# Patient Record
Sex: MALE | Race: WHITE | NOT HISPANIC OR LATINO | Employment: OTHER | URBAN - METROPOLITAN AREA
[De-identification: names, ages, dates, MRNs, and addresses within clinical notes are randomized per-mention and may not be internally consistent; named-entity substitution may affect disease eponyms.]

---

## 2021-02-22 ENCOUNTER — APPOINTMENT (OUTPATIENT)
Dept: CARDIOLOGY | Facility: MEDICAL CENTER | Age: 76
End: 2021-02-22
Attending: GENERAL PRACTICE
Payer: COMMERCIAL

## 2021-02-22 ENCOUNTER — HOSPITAL ENCOUNTER (OUTPATIENT)
Facility: MEDICAL CENTER | Age: 76
End: 2021-02-24
Attending: EMERGENCY MEDICINE | Admitting: HOSPITALIST
Payer: COMMERCIAL

## 2021-02-22 DIAGNOSIS — R94.31 ABNORMAL EKG: ICD-10-CM

## 2021-02-22 DIAGNOSIS — I51.89 RIGHT ATRIAL MASS: ICD-10-CM

## 2021-02-22 DIAGNOSIS — R07.89 OTHER CHEST PAIN: ICD-10-CM

## 2021-02-22 DIAGNOSIS — R07.2 PRECORDIAL PAIN: ICD-10-CM

## 2021-02-22 PROBLEM — Z79.01 CHRONIC ANTICOAGULATION: Status: ACTIVE | Noted: 2021-02-22

## 2021-02-22 PROBLEM — I20.0 UNSTABLE ANGINA (HCC): Status: ACTIVE | Noted: 2021-02-22

## 2021-02-22 PROBLEM — I48.91 AF (ATRIAL FIBRILLATION) (HCC): Status: ACTIVE | Noted: 2021-02-22

## 2021-02-22 PROBLEM — I48.0 PAROXYSMAL ATRIAL FIBRILLATION (HCC): Status: ACTIVE | Noted: 2021-02-22

## 2021-02-22 PROBLEM — E78.5 HYPERLIPIDEMIA: Status: ACTIVE | Noted: 2021-02-22

## 2021-02-22 LAB
ALBUMIN SERPL BCP-MCNC: 4 G/DL (ref 3.2–4.9)
ALBUMIN/GLOB SERPL: 1.5 G/DL
ALP SERPL-CCNC: 86 U/L (ref 30–99)
ALT SERPL-CCNC: 10 U/L (ref 2–50)
ANION GAP SERPL CALC-SCNC: 11 MMOL/L (ref 7–16)
APTT PPP: >240 SEC (ref 24.7–36)
AST SERPL-CCNC: 10 U/L (ref 12–45)
BASOPHILS # BLD AUTO: 1.2 % (ref 0–1.8)
BASOPHILS # BLD: 0.06 K/UL (ref 0–0.12)
BILIRUB SERPL-MCNC: 0.8 MG/DL (ref 0.1–1.5)
BUN SERPL-MCNC: 15 MG/DL (ref 8–22)
CALCIUM SERPL-MCNC: 9 MG/DL (ref 8.5–10.5)
CHLORIDE SERPL-SCNC: 105 MMOL/L (ref 96–112)
CO2 SERPL-SCNC: 25 MMOL/L (ref 20–33)
CREAT SERPL-MCNC: 0.9 MG/DL (ref 0.5–1.4)
EKG IMPRESSION: NORMAL
EOSINOPHIL # BLD AUTO: 0.04 K/UL (ref 0–0.51)
EOSINOPHIL NFR BLD: 0.8 % (ref 0–6.9)
ERYTHROCYTE [DISTWIDTH] IN BLOOD BY AUTOMATED COUNT: 48.8 FL (ref 35.9–50)
GLOBULIN SER CALC-MCNC: 2.7 G/DL (ref 1.9–3.5)
GLUCOSE SERPL-MCNC: 95 MG/DL (ref 65–99)
HCT VFR BLD AUTO: 42.4 % (ref 42–52)
HGB BLD-MCNC: 13.9 G/DL (ref 14–18)
IMM GRANULOCYTES # BLD AUTO: 0.01 K/UL (ref 0–0.11)
IMM GRANULOCYTES NFR BLD AUTO: 0.2 % (ref 0–0.9)
INR PPP: 1.39 (ref 0.87–1.13)
LV EJECT FRACT  99904: 65
LV EJECT FRACT MOD 2C 99903: 64.56
LV EJECT FRACT MOD 4C 99902: 70.93
LV EJECT FRACT MOD BP 99901: 65.36
LYMPHOCYTES # BLD AUTO: 0.72 K/UL (ref 1–4.8)
LYMPHOCYTES NFR BLD: 14.6 % (ref 22–41)
MAGNESIUM SERPL-MCNC: 1.9 MG/DL (ref 1.5–2.5)
MCH RBC QN AUTO: 33.4 PG (ref 27–33)
MCHC RBC AUTO-ENTMCNC: 32.8 G/DL (ref 33.7–35.3)
MCV RBC AUTO: 101.9 FL (ref 81.4–97.8)
MONOCYTES # BLD AUTO: 0.28 K/UL (ref 0–0.85)
MONOCYTES NFR BLD AUTO: 5.7 % (ref 0–13.4)
NEUTROPHILS # BLD AUTO: 3.81 K/UL (ref 1.82–7.42)
NEUTROPHILS NFR BLD: 77.5 % (ref 44–72)
NRBC # BLD AUTO: 0 K/UL
NRBC BLD-RTO: 0 /100 WBC
PLATELET # BLD AUTO: 102 K/UL (ref 164–446)
PMV BLD AUTO: 11.2 FL (ref 9–12.9)
POTASSIUM SERPL-SCNC: 4 MMOL/L (ref 3.6–5.5)
PROT SERPL-MCNC: 6.7 G/DL (ref 6–8.2)
PROTHROMBIN TIME: 17.5 SEC (ref 12–14.6)
RBC # BLD AUTO: 4.16 M/UL (ref 4.7–6.1)
SODIUM SERPL-SCNC: 141 MMOL/L (ref 135–145)
TROPONIN T SERPL-MCNC: 6 NG/L (ref 6–19)
TROPONIN T SERPL-MCNC: <6 NG/L (ref 6–19)
WBC # BLD AUTO: 4.9 K/UL (ref 4.8–10.8)

## 2021-02-22 PROCEDURE — 93306 TTE W/DOPPLER COMPLETE: CPT | Mod: 26 | Performed by: INTERNAL MEDICINE

## 2021-02-22 PROCEDURE — U0005 INFEC AGEN DETEC AMPLI PROBE: HCPCS

## 2021-02-22 PROCEDURE — 84484 ASSAY OF TROPONIN QUANT: CPT | Mod: 91

## 2021-02-22 PROCEDURE — 93005 ELECTROCARDIOGRAM TRACING: CPT

## 2021-02-22 PROCEDURE — 85025 COMPLETE CBC W/AUTO DIFF WBC: CPT

## 2021-02-22 PROCEDURE — 83735 ASSAY OF MAGNESIUM: CPT

## 2021-02-22 PROCEDURE — A9270 NON-COVERED ITEM OR SERVICE: HCPCS | Performed by: GENERAL PRACTICE

## 2021-02-22 PROCEDURE — 99285 EMERGENCY DEPT VISIT HI MDM: CPT

## 2021-02-22 PROCEDURE — G0378 HOSPITAL OBSERVATION PER HR: HCPCS

## 2021-02-22 PROCEDURE — 99204 OFFICE O/P NEW MOD 45 MIN: CPT | Performed by: INTERNAL MEDICINE

## 2021-02-22 PROCEDURE — 85610 PROTHROMBIN TIME: CPT

## 2021-02-22 PROCEDURE — 80053 COMPREHEN METABOLIC PANEL: CPT

## 2021-02-22 PROCEDURE — U0003 INFECTIOUS AGENT DETECTION BY NUCLEIC ACID (DNA OR RNA); SEVERE ACUTE RESPIRATORY SYNDROME CORONAVIRUS 2 (SARS-COV-2) (CORONAVIRUS DISEASE [COVID-19]), AMPLIFIED PROBE TECHNIQUE, MAKING USE OF HIGH THROUGHPUT TECHNOLOGIES AS DESCRIBED BY CMS-2020-01-R: HCPCS

## 2021-02-22 PROCEDURE — 700102 HCHG RX REV CODE 250 W/ 637 OVERRIDE(OP): Performed by: GENERAL PRACTICE

## 2021-02-22 PROCEDURE — 93306 TTE W/DOPPLER COMPLETE: CPT

## 2021-02-22 PROCEDURE — 99220 PR INITIAL OBSERVATION CARE,LEVL III: CPT | Performed by: HOSPITALIST

## 2021-02-22 PROCEDURE — 93005 ELECTROCARDIOGRAM TRACING: CPT | Performed by: EMERGENCY MEDICINE

## 2021-02-22 PROCEDURE — 85730 THROMBOPLASTIN TIME PARTIAL: CPT

## 2021-02-22 RX ORDER — DILTIAZEM HYDROCHLORIDE 180 MG/1
180 CAPSULE, COATED, EXTENDED RELEASE ORAL DAILY
Status: DISCONTINUED | OUTPATIENT
Start: 2021-02-23 | End: 2021-02-24 | Stop reason: HOSPADM

## 2021-02-22 RX ORDER — DILTIAZEM HYDROCHLORIDE 180 MG/1
180 CAPSULE, EXTENDED RELEASE ORAL
COMMUNITY
Start: 2020-12-17

## 2021-02-22 RX ORDER — SIMETHICONE 80 MG
80 TABLET,CHEWABLE ORAL NIGHTLY
Status: DISCONTINUED | OUTPATIENT
Start: 2021-02-22 | End: 2021-02-24 | Stop reason: HOSPADM

## 2021-02-22 RX ORDER — ATORVASTATIN CALCIUM 40 MG/1
40 TABLET, FILM COATED ORAL
COMMUNITY

## 2021-02-22 RX ORDER — DILTIAZEM HYDROCHLORIDE 180 MG/1
180 CAPSULE, COATED, EXTENDED RELEASE ORAL DAILY
Status: DISCONTINUED | OUTPATIENT
Start: 2021-02-23 | End: 2021-02-22

## 2021-02-22 RX ORDER — ATORVASTATIN CALCIUM 40 MG/1
40 TABLET, FILM COATED ORAL
Status: DISCONTINUED | OUTPATIENT
Start: 2021-02-22 | End: 2021-02-24 | Stop reason: HOSPADM

## 2021-02-22 RX ORDER — ASPIRIN 81 MG/1
81 TABLET, CHEWABLE ORAL DAILY
Status: DISCONTINUED | OUTPATIENT
Start: 2021-02-23 | End: 2021-02-24 | Stop reason: HOSPADM

## 2021-02-22 RX ORDER — DILTIAZEM HYDROCHLORIDE 120 MG/1
120 TABLET, FILM COATED ORAL DAILY
COMMUNITY
End: 2021-02-22

## 2021-02-22 RX ORDER — NITROGLYCERIN 0.4 MG/1
0.4 TABLET SUBLINGUAL
Status: DISCONTINUED | OUTPATIENT
Start: 2021-02-22 | End: 2021-02-24 | Stop reason: HOSPADM

## 2021-02-22 RX ADMIN — ATORVASTATIN CALCIUM 40 MG: 40 TABLET, FILM COATED ORAL at 21:34

## 2021-02-22 RX ADMIN — SIMETHICONE 80 MG: 80 TABLET, CHEWABLE ORAL at 21:33

## 2021-02-22 ASSESSMENT — LIFESTYLE VARIABLES
HAVE YOU EVER FELT YOU SHOULD CUT DOWN ON YOUR DRINKING: NO
TOTAL SCORE: 0
TOTAL SCORE: 0
EVER HAD A DRINK FIRST THING IN THE MORNING TO STEADY YOUR NERVES TO GET RID OF A HANGOVER: NO
DOES PATIENT WANT TO STOP DRINKING: NO
EVER FELT BAD OR GUILTY ABOUT YOUR DRINKING: NO
ON A TYPICAL DAY WHEN YOU DRINK ALCOHOL HOW MANY DRINKS DO YOU HAVE: 0
ALCOHOL_USE: NO
TOTAL SCORE: 0
HAVE PEOPLE ANNOYED YOU BY CRITICIZING YOUR DRINKING: NO
CONSUMPTION TOTAL: NEGATIVE
HOW MANY TIMES IN THE PAST YEAR HAVE YOU HAD 5 OR MORE DRINKS IN A DAY: 0
AVERAGE NUMBER OF DAYS PER WEEK YOU HAVE A DRINK CONTAINING ALCOHOL: 0

## 2021-02-22 ASSESSMENT — ENCOUNTER SYMPTOMS
PND: 0
FLANK PAIN: 0
WEAKNESS: 0
ABDOMINAL PAIN: 0
NERVOUS/ANXIOUS: 0
ORTHOPNEA: 0
DIZZINESS: 0
PALPITATIONS: 1
HEARTBURN: 0
GASTROINTESTINAL NEGATIVE: 1
FEVER: 0
CONSTITUTIONAL NEGATIVE: 1
NAUSEA: 0
SHORTNESS OF BREATH: 0
EYES NEGATIVE: 1
MYALGIAS: 0
RESPIRATORY NEGATIVE: 1
PHOTOPHOBIA: 0
NEUROLOGICAL NEGATIVE: 1
SINUS PAIN: 0
BLURRED VISION: 0
SORE THROAT: 0
DEPRESSION: 0
VOMITING: 0
MUSCULOSKELETAL NEGATIVE: 1
CHILLS: 0

## 2021-02-22 ASSESSMENT — PATIENT HEALTH QUESTIONNAIRE - PHQ9
1. LITTLE INTEREST OR PLEASURE IN DOING THINGS: NOT AT ALL
SUM OF ALL RESPONSES TO PHQ9 QUESTIONS 1 AND 2: 0
2. FEELING DOWN, DEPRESSED, IRRITABLE, OR HOPELESS: NOT AT ALL

## 2021-02-22 ASSESSMENT — FIBROSIS 4 INDEX
FIB4 SCORE: 2.39

## 2021-02-22 NOTE — H&P
History & Physical Note    Date of Admission: 2/22/2021  Admission Status: Observation-Outpatient  Attending: RADHA Paniagua M.D.   Contact Number: [phone]    Chief Complaint: chest pain    History of Present Illness (HPI): 75 year old male with a history of Atrial fibrillation on Xarelto, Mitral valve prolapse, prostate cancer s/p prostatectomy, hyperlipidemia, presents for chest pain that started last night while sitting in bed trying to sleep. Pain reported in middle of chest, described as pressure. Occurred 4 times and lasted 5-15 minutes each time and resolved spontaneously. Sitting up alleviates the pain. Pain non-radiating. Never had this pain before. Associated with shortness of breath and palpitations, but denies headache, visual disturbances, nausea, vomiting, orthopnea, PND, abdominal pain, or any other symptoms. No history of MI, CVA, DVT/PE, or HTN. Patient reports he follows a Cardiologist in [place].      Currently denies chest pain, SOB, palpitations. Transferred from [facility] in [place]. ECG showed NSR with ST depression V3-V5 with normal troponin of 0.03. Given Aspirin 325mg Heparin 5000U bolus, and Nitro paste that has relief his chest pain.      Review of Systems: Review of Systems   Constitutional: Negative.  Negative for chills, fever and malaise/fatigue.   HENT: Negative for congestion, sinus pain and sore throat.    Eyes: Negative.  Negative for blurred vision and photophobia.   Respiratory: Negative.  Negative for shortness of breath.    Cardiovascular: Positive for chest pain and palpitations. Negative for orthopnea, leg swelling and PND.   Gastrointestinal: Negative.  Negative for abdominal pain, heartburn, nausea and vomiting.   Genitourinary: Negative.  Negative for dysuria and flank pain.   Musculoskeletal: Negative.  Negative for joint pain and myalgias.   Skin: Negative.  Negative for rash.   Neurological: Negative.  Negative for dizziness and  weakness.   Psychiatric/Behavioral: Negative for depression. The patient is not nervous/anxious.          Past Medical History:   Past Medical History was reviewed with patient.   has no past medical history on file.    Past Surgical History: Past Surgical History was reviewed with patient.   has no past surgical history on file.    Medications: Medications have been reviewed with patient.  Prior to Admission Medications   Prescriptions Last Dose Informant Patient Reported? Taking?   Multiple Vitamins-Minerals (ZINC PO) 2/21/2021 at Unknown time  Yes Yes   Sig: Take 1 capsule by mouth every day.   VITAMIN E PO 2/21/2021 at Unknown time  Yes Yes   Sig: Take 1 capsule by mouth every day.   atorvastatin (LIPITOR) 40 MG Tab 2/21/2021 at Unknown time  Yes Yes   Sig: Take 40 mg by mouth every bedtime.   diltiazem (TIAZAC) 180 MG SR capsule 2/21/2021 at Unknown time  Yes Yes   Sig: Take 180 mg by mouth every day.   rivaroxaban (XARELTO) 20 MG Tab tablet 2/21/2021 at Unknown time  Yes Yes   Sig: Take 20 mg by mouth every morning.      Facility-Administered Medications: None        Allergies: Allergies have been reviewed with patient.  No Known Allergies    Family History: unknown for patient  family history is not on file.     Social History:   Tobacco: never  Alcohol: not since college  Recreational drugs (illegal and prescription):  none   Employment: retired  Activity Level: very active  Living situation:  , lives in [place], [place]  Recent travel:  none  Primary Care Provider: reviewed Pcp Pt States None  Vitals:  Temp:  [36.2 °C (97.1 °F)] 36.2 °C (97.1 °F)  Pulse:  [] 79  Resp:  [13-25] 20  BP: (130-160)/(63-86) 136/63  SpO2:  [97 %-99 %] 97 %    Physical Exam  Vitals and nursing note reviewed.   Constitutional:       General: He is not in acute distress.     Appearance: Normal appearance. He is normal weight. He is not ill-appearing or toxic-appearing.   HENT:      Head: Normocephalic and atraumatic.       Mouth/Throat:      Mouth: Mucous membranes are dry.      Pharynx: No oropharyngeal exudate or posterior oropharyngeal erythema.   Eyes:      Extraocular Movements: Extraocular movements intact.      Conjunctiva/sclera: Conjunctivae normal.      Pupils: Pupils are equal, round, and reactive to light.   Cardiovascular:      Rate and Rhythm: Normal rate and regular rhythm.      Pulses: Normal pulses.      Heart sounds: Normal heart sounds. No murmur. No friction rub. No gallop.    Pulmonary:      Effort: Pulmonary effort is normal. No respiratory distress.      Breath sounds: Normal breath sounds. No stridor. No wheezing, rhonchi or rales.   Abdominal:      General: Bowel sounds are normal. There is no distension.      Palpations: Abdomen is soft.      Tenderness: There is no abdominal tenderness.   Musculoskeletal:         General: No tenderness or deformity.      Cervical back: Neck supple.      Right lower leg: No edema.      Left lower leg: No edema.   Skin:     General: Skin is dry.      Capillary Refill: Capillary refill takes less than 2 seconds.      Findings: No rash.   Neurological:      Mental Status: He is alert and oriented to person, place, and time.      Motor: No weakness.   Psychiatric:         Mood and Affect: Mood normal.         Behavior: Behavior normal.         Labs:   Recent Labs     02/22/21  1203   WBC 4.9   RBC 4.16*   HEMOGLOBIN 13.9*   HEMATOCRIT 42.4   .9*   MCH 33.4*   RDW 48.8   PLATELETCT 102*   MPV 11.2   NEUTSPOLYS 77.50*   LYMPHOCYTES 14.60*   MONOCYTES 5.70   EOSINOPHILS 0.80   BASOPHILS 1.20     Recent Labs     02/22/21  1203   SODIUM 141   POTASSIUM 4.0   CHLORIDE 105   CO2 25   GLUCOSE 95   BUN 15     Troponin 0.06      EKG: Per my read, NSR with no ST or T wave acute changes appreciated.    Imaging:   EC-ECHOCARDIOGRAM COMPLETE W/O CONT    (Results Pending)         Previous Data Review: reviewed    Problem Representation: 75 year old male with a history of Afib on Xarelto,  Mitral valve prolapse, prostate cancer s/p prostatectomy 2013, HLD, presents with new onset chest pain at rest for one day that is substernal and relieved with nitroglycerin. ECG shows ST depression 1mm anterior leads with normal troponin.    * Precordial pain- (present on admission)  Assessment & Plan  DDX: Unstable angina vs esophageal spasm, dyspepsia. atypical chest pain with 2/3 characteristics (substernal and relieved with nitroglycerin).ECG shows no ischemic changes on admission, though ST depression V3-V5 at [facility] concerning for anterior ischemia. Troponin 6 on admission. NANCY score 5 with 26% all cause mortality in the next two weeks, Heart score 3, with 12-16% risk of major adverse cardiac event in the next 6 weeks.  -admit to telemetry for observation  -troponin x 2  -ASA 81mg starting tomorrow  -restart home statin  -Nitro PRN  -hold Xarelto  -no Heparin indicated at this time per Cardiology  -continue Diltiazem  -start Metoprolol tartrate 12.5 mg BID  -Cardiology has been consulted, Dr. Sheriff: If troponin is negative and no recurrent chest pain, could first proceed with pharmacological myocardial perfusion imaging and ECHO tomorrow AM. If troponin become elevated continue have chest pain will proceed with cardiac catheterization tomorrow      Hyperlipidemia- (present on admission)  Assessment & Plan  -continue Atorvastatin  -repeat lipid     Paroxysmal atrial fibrillation (HCC)- (present on admission)  Assessment & Plan  CHADSVASC score 2 (age): annual risk of stroke 2.2%.  -telemetry  -hold home Xarelto  -continue Diltiazem  -Echo

## 2021-02-22 NOTE — ED NOTES
[facility] Medical reporting NEG Covid result. Result form will be faxed and placed in chart.

## 2021-02-22 NOTE — ED NOTES
Med rec complete per pt and verified strengths with [facility]. NKDA. Pt denies taking ABX in the last 2 weeks

## 2021-02-22 NOTE — ASSESSMENT & PLAN NOTE
"-Pending MPI.  Troponins negative.  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  He had a MURIEL back in 2017 which showed \"lipomatous changes are seen associated with the tricuspid annulus giving a prominent appearance. No pathological characteristics are observed\".  - continue ASA, statin pending stress test.  Continue beta-blocker and cardizem.  Xarelto on hold pending ischemic work-up.  -Cardiology trying to obtain images from his previous echo/MURIEL, and will decide if needs repeat MURIEL versus cardiac MRI.  - monitor on telemetry.   - continue PRN SL NTG for CP recurrence.   "

## 2021-02-22 NOTE — ASSESSMENT & PLAN NOTE
-Continue normal sinus rhythm.  -Continue Cardizem CD, along with Lopressor.  Xarelto on hold pending ischemic evaluation.  -Monitor on telemetry.

## 2021-02-22 NOTE — ED NOTES
Pippa from lab called with critical result PTT greater then 240, repeated result back to Pippa. Dr Moralez at pt bedside and results given to MD.

## 2021-02-22 NOTE — ED PROVIDER NOTES
ED Provider Note    CHIEF COMPLAINT  Chief Complaint   Patient presents with   • Chest Pain     BIB EMS of intermittent chest pain since last night. Described as pressure. Transferred from [facility] for further eval of ST Depression. Pt given 1 in of Nitro, reporting no chest pain currently.       HPI  Eugene Hay is a 75 y.o. male who presents to the emergency department complaining of chest pain and an abnormal EKG.  The patient was complaining of chest discomfort throughout the night last night.  Started in the middle of the night.  He had some pain and vague pressure sensation in the middle of his chest associated with tingling.  Not radiate down his arm.  He has had intermittent shortness of breath and this morning had a little bit of nausea.  No diaphoresis.  He had a slight bit of shortness of breath with exertion as well.  No history of PE or DVT.  No pain or swelling the legs.  No travel or immobilization.  No sudden onset of tearing pain radiation to the scapula.  She has a history of paroxysmal A. fib and he takes chronic anticoagulation.  Last stress test was 20 years ago.  No cardiac angiogram or MI.  He has a history of high blood pressure, and high cholesterol.  Denies diabetes or drug abuse.  No tobacco use.  [place] [facility] [place] showed some anterior ST depression.  He was given aspirin nitroglycerin and heparin and sent here for further evaluation.    REVIEW OF SYSTEMS  See HPI for further details. All other systems are negative.    PAST MEDICAL HISTORY  History reviewed. No pertinent past medical history.    FAMILY HISTORY  History reviewed. No pertinent family history.    SOCIAL HISTORY  Social History     Socioeconomic History   • Marital status:      Spouse name: Not on file   • Number of children: Not on file   • Years of education: Not on file   • Highest education level: Not on file   Occupational History   • Not on file   Tobacco Use   •  Smoking status: Never Smoker   • Smokeless tobacco: Never Used   Substance and Sexual Activity   • Alcohol use: Not Currently   • Drug use: Never   • Sexual activity: Not on file   Other Topics Concern   • Not on file   Social History Narrative   • Not on file     Social Determinants of Health     Financial Resource Strain:    • Difficulty of Paying Living Expenses:    Food Insecurity:    • Worried About Running Out of Food in the Last Year:    • Ran Out of Food in the Last Year:    Transportation Needs:    • Lack of Transportation (Medical):    • Lack of Transportation (Non-Medical):    Physical Activity:    • Days of Exercise per Week:    • Minutes of Exercise per Session:    Stress:    • Feeling of Stress :    Social Connections:    • Frequency of Communication with Friends and Family:    • Frequency of Social Gatherings with Friends and Family:    • Attends Denominational Services:    • Active Member of Clubs or Organizations:    • Attends Club or Organization Meetings:    • Marital Status:    Intimate Partner Violence:    • Fear of Current or Ex-Partner:    • Emotionally Abused:    • Physically Abused:    • Sexually Abused:        SURGICAL HISTORY  History reviewed. No pertinent surgical history.    CURRENT MEDICATIONS  Home Medications     Reviewed by Christian Mason R.N. (Registered Nurse) on 02/22/21 at 1156  Med List Status: Not Addressed   Medication Last Dose Status   ATORVASTATIN CALCIUM PO  Active   DILTIAZem (CARDIZEM) 120 MG Tab  Active   rivaroxaban (XARELTO) 20 MG Tab tablet  Active                ALLERGIES  No Known Allergies    PHYSICAL EXAM  VITAL SIGNS: /73   Pulse 94   Resp 20   SpO2 99%    Constitutional: Well developed, Well nourished, No acute distress, Non-toxic appearance.   HENT: Normocephalic, Atraumatic, Bilateral external ears normal, Oropharynx moist, No oral exudates, Nose normal.   Eyes: PERRL, EOMI, Conjunctiva normal, No discharge.   Neck: Normal range of  motion,  Cardiovascular: Normal heart rate, Normal rhythm, No murmurs, No rubs, No gallops.   Thorax & Lungs: Normal breath sounds, No respiratory distress, No wheezing  Abdomen: Bowel sounds normal, Soft, No tenderness  Skin: Warm, Dry, No erythema, No rash.   Back: No tenderness, No CVA tenderness.    Musculoskeletal: Good range of motion in all major joints.  No asymmetric edema  Neurologic: Alert,No focal deficits noted.   Psychiatric: Affect normal      Results for orders placed or performed during the hospital encounter of 02/22/21   CBC WITH DIFFERENTIAL   Result Value Ref Range    WBC 4.9 4.8 - 10.8 K/uL    RBC 4.16 (L) 4.70 - 6.10 M/uL    Hemoglobin 13.9 (L) 14.0 - 18.0 g/dL    Hematocrit 42.4 42.0 - 52.0 %    .9 (H) 81.4 - 97.8 fL    MCH 33.4 (H) 27.0 - 33.0 pg    MCHC 32.8 (L) 33.7 - 35.3 g/dL    RDW 48.8 35.9 - 50.0 fL    Platelet Count 102 (L) 164 - 446 K/uL    MPV 11.2 9.0 - 12.9 fL    Neutrophils-Polys 77.50 (H) 44.00 - 72.00 %    Lymphocytes 14.60 (L) 22.00 - 41.00 %    Monocytes 5.70 0.00 - 13.40 %    Eosinophils 0.80 0.00 - 6.90 %    Basophils 1.20 0.00 - 1.80 %    Immature Granulocytes 0.20 0.00 - 0.90 %    Nucleated RBC 0.00 /100 WBC    Neutrophils (Absolute) 3.81 1.82 - 7.42 K/uL    Lymphs (Absolute) 0.72 (L) 1.00 - 4.80 K/uL    Monos (Absolute) 0.28 0.00 - 0.85 K/uL    Eos (Absolute) 0.04 0.00 - 0.51 K/uL    Baso (Absolute) 0.06 0.00 - 0.12 K/uL    Immature Granulocytes (abs) 0.01 0.00 - 0.11 K/uL    NRBC (Absolute) 0.00 K/uL   COMP METABOLIC PANEL   Result Value Ref Range    Sodium 141 135 - 145 mmol/L    Potassium 4.0 3.6 - 5.5 mmol/L    Chloride 105 96 - 112 mmol/L    Co2 25 20 - 33 mmol/L    Anion Gap 11.0 7.0 - 16.0    Glucose 95 65 - 99 mg/dL    Bun 15 8 - 22 mg/dL    Creatinine 0.90 0.50 - 1.40 mg/dL    Calcium 9.0 8.5 - 10.5 mg/dL    AST(SGOT) 10 (L) 12 - 45 U/L    ALT(SGPT) 10 2 - 50 U/L    Alkaline Phosphatase 86 30 - 99 U/L    Total Bilirubin 0.8 0.1 - 1.5 mg/dL    Albumin 4.0  3.2 - 4.9 g/dL    Total Protein 6.7 6.0 - 8.2 g/dL    Globulin 2.7 1.9 - 3.5 g/dL    A-G Ratio 1.5 g/dL   TROPONIN   Result Value Ref Range    Troponin T <6 6 - 19 ng/L   APTT   Result Value Ref Range    APTT >240.0 (HH) 24.7 - 36.0 sec   PROTHROMBIN TIME (INR)   Result Value Ref Range    PT 17.5 (H) 12.0 - 14.6 sec    INR 1.39 (H) 0.87 - 1.13   ESTIMATED GFR   Result Value Ref Range    GFR If African American >60 >60 mL/min/1.73 m 2    GFR If Non African American >60 >60 mL/min/1.73 m 2   EKG   Result Value Ref Range    Report       [facility] Emergency Dept.    Test Date:  2021  Pt Name:    FILIBERTO HAHN             Department: ER  MRN:        [id number]                      Room:       [facility]  Gender:     Male                         Technician: [id number]  :        1945                   Requested By:ER TRIAGE PROTOCOL  Order #:    [id number]                    Reading MD: BEATA GIBSON. FRITZ    Measurements  Intervals                                Axis  Rate:       90                           P:          74  ID:         [id number]                          QRS:        0  QRSD:       73                           T:          62  QT:         360  QTc:        440    Interpretive Statements  Sinus rhythm  Borderline low voltage, extremity leads  No previous ECG available for comparison  Electronically Signed On 2021 11:56:26 PST by BEATA GIBSON. [facility]          RADIOLOGY/PROCEDURES  No orders to display         COURSE & MEDICAL DECISION MAKING  Pertinent Labs & Imaging studies reviewed. (See chart for details)  The patient presents here transferred from an outlying hospital for chest pain and abnormal EKG.  Differential diagnosis includes but not limited to COVID-19, pneumonia, ACS, dissection, PE, unstable angina.    Patient is worked up with above differential diagnosis at the transferring hospital had a negative chest x-ray negative D-dimer and negative troponin.    Is negative chest  x-ray makes pneumonia pneumothorax unlikely.  His clinical history is not suggestive of of a dissection or a PE.  He had a negative D-dimer at the transferring hospital.      The patient was transferred here on heparin this is stopped.    The patient's EKG here shows some subtle ST depression but I do not think this is ischemic in the absence of discomfort.  We will wait for his troponin.  Discussed the case with the hospitalist team the patient will hospitalist for further work-up and treatment care transferred at that time.      The patient did have an elevated PTT the heparin has been held his entire time here in the ED.  Troponin remains negative.    FINAL IMPRESSION  1. Other chest pain     2. Abnormal EKG         3.         Electronically signed by: Preet Howard M.D., 2/22/2021 12:34 PM

## 2021-02-22 NOTE — ED TRIAGE NOTES
Chief Complaint   Patient presents with   • Chest Pain     BIB EMS of intermittent chest pain since last night. Described as pressure. Transferred from [facility] for further eval of ST Depression. Pt given 1 in of Nitro, reporting no chest pain currently.

## 2021-02-22 NOTE — DISCHARGE PLANNING
Pt lives with wife in [place] although they are in the process of moving here. He has PCP in [place] (Dr Hamlin or Dr Pineda). Has [facility] insurance with Rx benefits.      Care Transition Team Assessment  (Denis Srinivasan, sponsor~ [phone])    Information Source  Orientation : Oriented x 4  Information Given By: Patient  Who is responsible for making decisions for patient? : Patient    Readmission Evaluation  Is this a readmission?: No    Elopement Risk  Legal Hold: No    Interdisciplinary Discharge Planning  Does Admitting Nurse Feel This Could be a Complex Discharge?: No  Primary Care Physician: Dr Hamlin in [place]  Lives with - Patient's Self Care Capacity: Significant Other  Support Systems: Spouse / Significant Other, Friends / Neighbors  Do You Take your Prescribed Medications Regularly: Yes  Able to Return to Previous ADL's: Yes  Mobility Issues: No  Patient Prefers to be Discharged to:: home  Assistance Needed: Unknown at this Time  Durable Medical Equipment: Not Applicable    Discharge Preparedness  What is your plan after discharge?: Home with help  Prior Functional Level: Independent with Activities of Daily Living  Difficulity with ADLs: None  Difficulity with IADLs: None    Functional Assesment  Prior Functional Level: Independent with Activities of Daily Living    Finances  Financial Barriers to Discharge: No  Prescription Coverage: Yes    Advance Directive  Advance Directive?: None    Domestic Abuse  Have you ever been the victim of abuse or violence?: No     Anticipated Discharge Information  Discharge Disposition: Still a Patient (30)  Discharge Address: [address] [place] [address]  Discharge Contact Phone Number: [phone]

## 2021-02-23 ENCOUNTER — APPOINTMENT (OUTPATIENT)
Dept: RADIOLOGY | Facility: MEDICAL CENTER | Age: 76
End: 2021-02-23
Attending: INTERNAL MEDICINE
Payer: COMMERCIAL

## 2021-02-23 ENCOUNTER — APPOINTMENT (OUTPATIENT)
Dept: RADIOLOGY | Facility: MEDICAL CENTER | Age: 76
End: 2021-02-23
Attending: GENERAL PRACTICE
Payer: COMMERCIAL

## 2021-02-23 PROBLEM — I51.89 RIGHT ATRIAL MASS: Status: ACTIVE | Noted: 2021-02-23

## 2021-02-23 LAB
ALBUMIN SERPL BCP-MCNC: 3.8 G/DL (ref 3.2–4.9)
ALBUMIN/GLOB SERPL: 1.5 G/DL
ALP SERPL-CCNC: 78 U/L (ref 30–99)
ALT SERPL-CCNC: 8 U/L (ref 2–50)
ANION GAP SERPL CALC-SCNC: 11 MMOL/L (ref 7–16)
AST SERPL-CCNC: 11 U/L (ref 12–45)
BILIRUB SERPL-MCNC: 0.9 MG/DL (ref 0.1–1.5)
BUN SERPL-MCNC: 18 MG/DL (ref 8–22)
CALCIUM SERPL-MCNC: 8.9 MG/DL (ref 8.5–10.5)
CHLORIDE SERPL-SCNC: 106 MMOL/L (ref 96–112)
CHOLEST SERPL-MCNC: 101 MG/DL (ref 100–199)
CO2 SERPL-SCNC: 23 MMOL/L (ref 20–33)
CREAT SERPL-MCNC: 0.81 MG/DL (ref 0.5–1.4)
ERYTHROCYTE [DISTWIDTH] IN BLOOD BY AUTOMATED COUNT: 48.4 FL (ref 35.9–50)
GLOBULIN SER CALC-MCNC: 2.5 G/DL (ref 1.9–3.5)
GLUCOSE SERPL-MCNC: 85 MG/DL (ref 65–99)
HCT VFR BLD AUTO: 40.4 % (ref 42–52)
HDLC SERPL-MCNC: 41 MG/DL
HGB BLD-MCNC: 13.2 G/DL (ref 14–18)
LDLC SERPL CALC-MCNC: 48 MG/DL
MCH RBC QN AUTO: 33.1 PG (ref 27–33)
MCHC RBC AUTO-ENTMCNC: 32.7 G/DL (ref 33.7–35.3)
MCV RBC AUTO: 101.3 FL (ref 81.4–97.8)
PLATELET # BLD AUTO: 112 K/UL (ref 164–446)
PMV BLD AUTO: 11.2 FL (ref 9–12.9)
POTASSIUM SERPL-SCNC: 3.8 MMOL/L (ref 3.6–5.5)
PROT SERPL-MCNC: 6.3 G/DL (ref 6–8.2)
RBC # BLD AUTO: 3.99 M/UL (ref 4.7–6.1)
SARS-COV-2 RNA RESP QL NAA+PROBE: NOTDETECTED
SODIUM SERPL-SCNC: 140 MMOL/L (ref 135–145)
SPECIMEN SOURCE: NORMAL
TRIGL SERPL-MCNC: 62 MG/DL (ref 0–149)
TROPONIN T SERPL-MCNC: 7 NG/L (ref 6–19)
WBC # BLD AUTO: 4.8 K/UL (ref 4.8–10.8)

## 2021-02-23 PROCEDURE — 99225 PR SUBSEQUENT OBSERVATION CARE,LEVEL II: CPT | Performed by: INTERNAL MEDICINE

## 2021-02-23 PROCEDURE — 80053 COMPREHEN METABOLIC PANEL: CPT

## 2021-02-23 PROCEDURE — A9502 TC99M TETROFOSMIN: HCPCS

## 2021-02-23 PROCEDURE — 71045 X-RAY EXAM CHEST 1 VIEW: CPT

## 2021-02-23 PROCEDURE — A9270 NON-COVERED ITEM OR SERVICE: HCPCS | Performed by: GENERAL PRACTICE

## 2021-02-23 PROCEDURE — G0378 HOSPITAL OBSERVATION PER HR: HCPCS

## 2021-02-23 PROCEDURE — 85027 COMPLETE CBC AUTOMATED: CPT

## 2021-02-23 PROCEDURE — 99214 OFFICE O/P EST MOD 30 MIN: CPT | Performed by: INTERNAL MEDICINE

## 2021-02-23 PROCEDURE — 700111 HCHG RX REV CODE 636 W/ 250 OVERRIDE (IP)

## 2021-02-23 PROCEDURE — 84484 ASSAY OF TROPONIN QUANT: CPT

## 2021-02-23 PROCEDURE — 700102 HCHG RX REV CODE 250 W/ 637 OVERRIDE(OP): Performed by: GENERAL PRACTICE

## 2021-02-23 PROCEDURE — 80061 LIPID PANEL: CPT

## 2021-02-23 RX ORDER — REGADENOSON 0.08 MG/ML
INJECTION, SOLUTION INTRAVENOUS
Status: COMPLETED
Start: 2021-02-23 | End: 2021-02-23

## 2021-02-23 RX ORDER — REGADENOSON 0.08 MG/ML
0.4 INJECTION, SOLUTION INTRAVENOUS ONCE
Status: COMPLETED | OUTPATIENT
Start: 2021-02-23 | End: 2021-02-23

## 2021-02-23 RX ADMIN — REGADENOSON 0.4 MG: 0.08 INJECTION, SOLUTION INTRAVENOUS at 15:02

## 2021-02-23 RX ADMIN — ATORVASTATIN CALCIUM 40 MG: 40 TABLET, FILM COATED ORAL at 20:38

## 2021-02-23 RX ADMIN — SIMETHICONE 80 MG: 80 TABLET, CHEWABLE ORAL at 20:38

## 2021-02-23 RX ADMIN — METOPROLOL TARTRATE 12.5 MG: 25 TABLET, FILM COATED ORAL at 18:53

## 2021-02-23 RX ADMIN — DILTIAZEM HYDROCHLORIDE 180 MG: 180 CAPSULE, COATED, EXTENDED RELEASE ORAL at 04:29

## 2021-02-23 ASSESSMENT — PATIENT HEALTH QUESTIONNAIRE - PHQ9
2. FEELING DOWN, DEPRESSED, IRRITABLE, OR HOPELESS: NOT AT ALL
SUM OF ALL RESPONSES TO PHQ9 QUESTIONS 1 AND 2: 0
1. LITTLE INTEREST OR PLEASURE IN DOING THINGS: NOT AT ALL
1. LITTLE INTEREST OR PLEASURE IN DOING THINGS: NOT AT ALL
2. FEELING DOWN, DEPRESSED, IRRITABLE, OR HOPELESS: NOT AT ALL
SUM OF ALL RESPONSES TO PHQ9 QUESTIONS 1 AND 2: 0

## 2021-02-23 ASSESSMENT — PAIN SCALES - WONG BAKER: WONGBAKER_NUMERICALRESPONSE: DOESN'T HURT AT ALL

## 2021-02-23 ASSESSMENT — PAIN DESCRIPTION - PAIN TYPE: TYPE: ACUTE PAIN

## 2021-02-23 NOTE — PROGRESS NOTES
Report received from PHOEBE Georges.  Patient brought up to the CDU form the Red Pod of the ED via [place] by an ACLS RN in the ED.  POC gone over with the patient, and all questions answered.  Patient A & O  X 4.  No apparent signs of distress.  Safety precautions in place.  Patient educated to call for assistance.  Will continue to monitor.

## 2021-02-23 NOTE — PROGRESS NOTES
"       [facility] Cardiology Follow-up Note    Date of Service:    2/23/2021      Name:   Eugene Hay     YOB: 1945  Age:   75 y.o.  male   MRN:   [id number]      Chief Complaint: AFIB, CP, R atrial mass    HPI:  Mr Hay is a 76 y/o fellow with PMH including PAF on Xarelto who presented from [facility] with chest pain. Ruled out for ACS with negative trop x 3, however echo found a right atrial mass.    Review of Care Everywhere shows MURIEL from 2017 at [facility] in [place], [place] with R atrial abnormality.  Patient states he was aware of this, but they said it was \"fatty area\" nothing to worry about.      I can also see he had prior TTE in 11/2019 at outside facility.    Interim Events:  He c/o of chest discomfort overnight.   Momentary \"blips\" to the lower sternum followed by a few minutes of chest pressure.  This occurred several times throughout the night.   States burping relieved his pain.  This pain is different than what brought him in.     ROS  Constitutional:  + fatigue. .  Respiratory:  Denies shortness of breath, no cough.  Cardiovascular:  + atypical chest pain.  No  lower extremity edema.  Denies orthopnea or PND.  : denies polyuria, no dysuria.  GI:  Denies nausea/vomiting.  No abdominal distention.  Neuro:  Denies dizziness, syncope.  Hem/lymph: Denies easy bleeding/bruising.      All other review of systems reviewed and negative.    Past medical, surgical, social, and family history reviewed and unchanged from admission except as noted in HPI.    Medications: Reviewed in MAR  Current Facility-Administered Medications   Medication Dose Frequency Provider Last Rate Last Admin   • atorvastatin (LIPITOR) tablet 40 mg  40 mg QHS Enio Moralez Jr., M.D.   40 mg at 02/22/21 2134   • aspirin (ASA) chewable tab 81 mg  81 mg DAILY Enio Moralez Jr., M.D.       • nitroglycerin (NITROSTAT) tablet 0.4 mg  0.4 mg Q5 MIN PRN Enio Moralez Jr., M.D.       • simethicone (MYLICON) " "chewable tab 80 mg  80 mg Nightly Enio Moralez Jr., M.D.   80 mg at 02/22/21 2133   • DILTIAZem CD (CARDIZEM CD) capsule 180 mg  180 mg DAILY Enio Moralez Jr., M.D.   180 mg at 02/23/21 0429   • metoprolol tartrate (LOPRESSOR) tablet 12.5 mg  12.5 mg TWICE DAILY Enio Moralez Jr., M.D.       Last reviewed on 2/22/2021 12:50 PM by Sally Fong T    No Known Allergies    Physical Exam  Body mass index is 21.12 kg/m². /58   Pulse 97   Temp 36.7 °C (98 °F) (Temporal)   Resp (!) 29   Ht 1.727 m (5' 8\")   Wt 63 kg (138 lb 14.2 oz)   SpO2 96%    Vitals:    02/22/21 2000 02/22/21 2032 02/23/21 0000 02/23/21 0500   BP:   131/61 116/58   Pulse: 99  91 97   Resp: (!) 27  15 (!) 29   Temp: 36.7 °C (98 °F)  36.7 °C (98 °F)    TempSrc: Temporal  Temporal    SpO2: 95%  98% 96%   Weight:  63 kg (138 lb 14.2 oz)     Height:        Oxygen Therapy:  Pulse Oximetry: 96 %, O2 (LPM): 0, O2 Delivery Device: None - Room Air    General: no apparent distress  Neck: no JVD   Lungs: normal effort,  without crackles, no wheezing or rhonchi  Heart: normal rate, regular rhythm, no murmur, no rub  EXT: no lower extremity edema  Abdomen: soft, non tender, non distended  Neurological: No focal deficits, no facial asymmetry.  Normal speech  Psychiatric: Appropriate affect, alert and oriented x 3  Skin: Warm extremities, no rash    Labs (personally reviewed):     Lab Results   Component Value Date/Time    SODIUM 140 02/23/2021 04:30 AM    POTASSIUM 3.8 02/23/2021 04:30 AM    CHLORIDE 106 02/23/2021 04:30 AM    CO2 23 02/23/2021 04:30 AM    GLUCOSE 85 02/23/2021 04:30 AM    BUN 18 02/23/2021 04:30 AM    CREATININE 0.81 02/23/2021 04:30 AM    CREATININE 1.0 04/10/2007 04:00 PM    BUNCREATRAT NOT APPLICABLE 09/11/2018 01:04 PM     Lab Results   Component Value Date/Time    ALKPHOSPHAT 78 02/23/2021 04:30 AM    ASTSGOT 11 (L) 02/23/2021 04:30 AM    ALTSGPT 8 02/23/2021 04:30 AM    TBILIRUBIN 0.9 02/23/2021 04:30 AM    "   Lab Results   Component Value Date/Time    CHOLSTRLTOT 101 2021 04:30 AM    LDL 48 2021 04:30 AM    HDL 41 2021 04:30 AM    TRIGLYCERIDE 62 2021 04:30 AM         Cardiac Imaging and Procedures Review:      Personal Telemetry Review:  SR in the 70s on tele. No arrhythmia, no ectopy.    Echo 21:  CONCLUSIONS  Normal chamber sizes. Normal RV and LV systolic function. LVEF 65%   visually. No significant valvular abnormalities. There is an echogenic   multilobular mass that appears to be attached to the free wall in the   RA, best seen in substernal and apical views. Consider CT or MRI to get   a better look at this mass. No prior study is available for comparison.    Echo 2019 ([facility])    INTERPRETATION SUMMARY  1.) The left ventricle is normal in size and function, ejection fraction is 70% calculated by biplane  2.) The right ventricle is normal in size and function  3.) No significant valvular abnormality seen  4.) Unable to assess for pulmonary hypertension    MURIEL 2017:    INTERPRETATION SUMMARY  1. Grossly normal LV size and systolic function.  2. Mild MR.  3. Trace AR, TR, and PI.  4. Prominent lopez terminalis is seen within the right atrium both on  surface echo images as well as MURIEL.  5. Lipomatous changes are seen associated with the tricuspid annulus giving a  prominent appearance. No pathological characteristics are observed. However,  if clinically indicated, recommend gated CT for further imaging.    Stress test 2019:  [facility]  I cannot see results in care everywhere  No comments in follow up cardiology visit 19 (Dr. Minor)      Assessment and Medical Decision Makin   Chest pain, atypical   -    Ruled out for ACS with neg trop x 3  -    Will proceed with nuclear stress test today.    2   Paroxysmal atrial fibrillation  -   Continue Dilt 180, metoprolol 12.5 BID  -   Xarelto on hold pending ischemic eval.    3   Hx of AVNRT    4   Right atrial  mass  -    Discussed with Dr. Sheriff this AM, will cxl MRI for now.  Obtain images from prior MURIEL, TTE at outside hospital.     Will follow.       Candi Wolfe PA-C  [facility]

## 2021-02-23 NOTE — PROGRESS NOTES
Update :   Echo done today 02/22/21 Showed an echogenic multilobular mass attached to the free wall in the right atrium.    Will order a cardiac MRI to get a better look at this mass as per the recommendations by .

## 2021-02-23 NOTE — DISCHARGE PLANNING
Updated Amaya BECERRA CM that patient might need a ride assist. Spoke with Chantal and cost for [facility] to [facility] is $75.00.

## 2021-02-23 NOTE — PROGRESS NOTES
Hospital Medicine Daily Progress Note    Date of Service  2/23/2021    Chief Complaint  Chest pain    Hospital Course  75 y.o. male with atrial fibrillation on Xarelto, history of mitral valve prolapse, history of prostate cancer, hyperlipidemia, admitted 2/22/2021 with atypical chest pain.  Troponin was negative.  EKG showed nonspecific 1 mm ST depressions in anterior leads.  Cardiology was consulted, who recommended stress testing.    Interval Problem Update  2/23/2021 - I reviewed the patient's chart. There were no significant overnight events. Remains hemodynamically stable and afebrile. Stable on RA.  No leukocytosis.  Hemoglobin is stable.  Electrolytes and renal function, LFTs were normal.  Her troponins remain negative x3.  Echocardiogram showed EF of 65%, with no significant valvular abnormalities, with echogenic multilobular mass that appears to be attached to the free wall in the right atrium best seen in the substernal and apical views. MPI is pending. Discussed with cardiology, they will get previous echo and MURIEL records from [place] and decide if we will need MURIEL versus cardiac MRI.    > I have personally seen and examined the patient today. He is currently comfortable.  Currently chest pain-free.  No other complaints.  He is eating well.  No nausea, vomiting, shortness of breath, abdominal pain, bowel movement changes.      Consultants/Specialty  cardiology    Code Status  Full Code    Disposition  Monitor in the CDU    Review of Systems  ROS     Pertinent positives/negatives as mentioned above.     A complete review of systems was personally done by me. All other systems were negative.       Physical Exam  Temp:  [36.4 °C (97.5 °F)-36.7 °C (98 °F)] 36.4 °C (97.5 °F)  Pulse:  [] 100  Resp:  [15-29] 20  BP: (104-146)/(53-75) 146/75  SpO2:  [95 %-98 %] 98 %    Physical Exam  Vitals reviewed.   Constitutional:       General: He is not in acute distress.     Appearance: Normal appearance. He is not  toxic-appearing or diaphoretic.   HENT:      Head: Normocephalic and atraumatic.      Right Ear: External ear normal.      Left Ear: External ear normal.      Mouth/Throat:      Mouth: Mucous membranes are moist.      Pharynx: No oropharyngeal exudate.   Eyes:      General: No scleral icterus.     Extraocular Movements: Extraocular movements intact.      Conjunctiva/sclera: Conjunctivae normal.      Pupils: Pupils are equal, round, and reactive to light.   Cardiovascular:      Rate and Rhythm: Normal rate and regular rhythm.      Heart sounds: Normal heart sounds. No murmur. No gallop.    Pulmonary:      Effort: Pulmonary effort is normal. No respiratory distress.      Breath sounds: Normal breath sounds. No stridor. No wheezing, rhonchi or rales.   Chest:      Chest wall: No tenderness.   Abdominal:      General: Bowel sounds are normal. There is no distension.      Palpations: Abdomen is soft. There is no mass.      Tenderness: There is no abdominal tenderness. There is no guarding or rebound.   Musculoskeletal:         General: No swelling. Normal range of motion.      Cervical back: Normal range of motion and neck supple.      Right lower leg: No edema.      Left lower leg: No edema.   Lymphadenopathy:      Cervical: No cervical adenopathy.   Skin:     General: Skin is warm and dry.      Coloration: Skin is not jaundiced.      Findings: No rash.   Neurological:      General: No focal deficit present.      Mental Status: He is alert and oriented to person, place, and time.      Cranial Nerves: No cranial nerve deficit.   Psychiatric:         Mood and Affect: Mood normal.         Behavior: Behavior normal.         Thought Content: Thought content normal.         Judgment: Judgment normal.         Fluids    Intake/Output Summary (Last 24 hours) at 2/23/2021 1608  Last data filed at 2/22/2021 1900  Gross per 24 hour   Intake 240 ml   Output --   Net 240 ml       Laboratory  Recent Labs     02/22/21  1203  "02/23/21  0430   WBC 4.9 4.8   RBC 4.16* 3.99*   HEMOGLOBIN 13.9* 13.2*   HEMATOCRIT 42.4 40.4*   .9* 101.3*   MCH 33.4* 33.1*   MCHC 32.8* 32.7*   RDW 48.8 48.4   PLATELETCT 102* 112*   MPV 11.2 11.2     Recent Labs     02/22/21  1203 02/23/21  0430   SODIUM 141 140   POTASSIUM 4.0 3.8   CHLORIDE 105 106   CO2 25 23   GLUCOSE 95 85   BUN 15 18   CREATININE 0.90 0.81   CALCIUM 9.0 8.9     Recent Labs     02/22/21  1203   APTT >240.0*   INR 1.39*         Recent Labs     02/23/21  0430   TRIGLYCERIDE 62   HDL 41   LDL 48       Imaging  NM-CARDIAC STRESS TEST         EC-ECHOCARDIOGRAM COMPLETE W/O CONT   Final Result      DX-CHEST-PORTABLE (1 VIEW)    (Results Pending)        Assessment/Plan  * Precordial pain- (present on admission)  Assessment & Plan  -Pending MPI.  Troponins negative.  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  He had a MURIEL back in 2017 which showed \"lipomatous changes are seen associated with the tricuspid annulus giving a prominent appearance. No pathological characteristics are observed\".  - continue ASA, statin pending stress test.  Continue beta-blocker and cardizem.  Xarelto on hold pending ischemic work-up.  -Cardiology trying to obtain images from his previous echo/MURIEL, and will decide if needs repeat MURIEL versus cardiac MRI.  - monitor on telemetry.   - continue PRN SL NTG for CP recurrence.     Right atrial mass  Assessment & Plan  - workup as above.     Chronic anticoagulation- (present on admission)  Assessment & Plan  -Xarelto on hold pending ischemic evaluation.    Hyperlipidemia- (present on admission)  Assessment & Plan  -continue Atorvastatin.    Paroxysmal atrial fibrillation (HCC)- (present on admission)  Assessment & Plan  -Continue normal sinus rhythm.  -Continue Cardizem CD, along with Lopressor.  Xarelto on hold pending ischemic evaluation.  -Monitor on telemetry.       VTE prophylaxis: SCD      "

## 2021-02-23 NOTE — CONSULTS
"Cardiology Consultation Note      Date of service: 2/22/2021      Requesting Physician: Dr. Tyler Paniagua      Reason for consultation: Chest pain with abnormal EKG      History of present illness    The patient is 75 years old male with history of paroxysmal atrial fibrillation on anticoagulation and dyslipidemia who was transferred from [facility] for evaluation of chest pain.  He stated that he has been having intermittent \"blip\" in his chest when he lies down for years. Last night, he however was experiencing different type of chest discomfort described as squeezing pressure-like sensation off and on lasting up to 5-10 minutes.  There was no associated nausea, vomiting, shortness of breath or diaphoresis.  He denies recent unusual strenuous activity, flulike illness or recent long trip.    Initially EKG at another facility by my review showed relatively horizontal ST depression in the anterolateral leads slightly less than 1 mm and probbale left atrial management.    His Tn has been negative.  He is currently pain free      No Known Allergies    @HOMEMEDS    Xarelto 20 mg daily, diltiazem 180 mg SR daily, atorvastatin 40 mg daily      Current Facility-Administered Medications:   •  atorvastatin (LIPITOR) tablet 40 mg, 40 mg, Oral, QHS, Enio Moralez Jr., M.D.  •  [START ON 2/23/2021] DILTIAZem CD (CARDIZEM CD) capsule 180 mg, 180 mg, Oral, DAILY, Enio Moralez Jr., M.D.  •  [START ON 2/23/2021] aspirin (ASA) chewable tab 81 mg, 81 mg, Oral, DAILY, Enio Moralez Jr., M.D.  •  nitroglycerin (NITROSTAT) tablet 0.4 mg, 0.4 mg, Sublingual, Q5 MIN PRN, Enio Moralez Jr., M.D.    Past medical history. No DM or HTN     History reviewed. No pertinent surgical history.    History reviewed. No pertinent family history.    Social History     Socioeconomic History   • Marital status:      Spouse name: Not on file   • Number of children: Not on file   • Years of education: " Not on file   • Highest education level: Not on file   Occupational History   • Not on file   Tobacco Use   • Smoking status: Never Smoker   • Smokeless tobacco: Never Used   Substance and Sexual Activity   • Alcohol use: Not Currently   • Drug use: Never   • Sexual activity: Not on file   Other Topics Concern   • Not on file   Social History Narrative   • Not on file     Social Determinants of Health     Financial Resource Strain:    • Difficulty of Paying Living Expenses:    Food Insecurity:    • Worried About Running Out of Food in the Last Year:    • Ran Out of Food in the Last Year:    Transportation Needs:    • Lack of Transportation (Medical):    • Lack of Transportation (Non-Medical):    Physical Activity:    • Days of Exercise per Week:    • Minutes of Exercise per Session:    Stress:    • Feeling of Stress :    Social Connections:    • Frequency of Communication with Friends and Family:    • Frequency of Social Gatherings with Friends and Family:    • Attends [employer] Services:    • Active Member of Clubs or Organizations:    • Attends Club or Organization Meetings:    • Marital Status:    Intimate Partner Violence:    • Fear of Current or Ex-Partner:    • Emotionally Abused:    • Physically Abused:    • Sexually Abused:          Review of systems;    General: No fever, chills, no recent weight change, no weakness or fatigue  HENT: No discharge, no ringing in the ears, no toothache or sore throat, no neck pain  Eyes: No redness, no blurred vision or double vision  Heart: No palpitation, no PND or orthopnea, no claudication, no leg swelling  Lung: No productive cough, no hemoptysis  Abdomen: No abdominal pain, no nausea vomiting or diarrhea, no blood in stool  : No dysuria, no frequency or hematuria  Musculoskeletal: No myalgia, no back pain, some joint pain  Hematology: No easy bruising  Skin: No rash or itching  Neurological: No headache, no new focal weakness or numbness  Psychological: Denies  "depression, anxiety or insomnia  All other review of systems are negative    Vitals:    02/22/21 1301 02/22/21 1320 02/22/21 1340 02/22/21 1603   BP:  134/70 135/69 136/63   Pulse: 95 (!) 101 92 79   Resp: 19 (!) 25 13 20   Temp:    36.2 °C (97.1 °F)   TempSrc:    Temporal   SpO2: 97% 98% 99% 97%   Weight:    63 kg (138 lb 14.2 oz)   Height:    1.727 m (5' 8\")     GENERAL not in acute distress, not dyspnic at rest, thin  Head atraumatic, normocephalic  Eyes EOMI  ENT neck supple, no JVD, no carotid bruits or thyromegaly  Lung good expansion, distant sound, no rales or wheezing  Heart RRR, normal rate, no murmur, gallop or rub  Abd soft, no tenderness, mass or bruits  Ext no edema  Skin no ecchymosis or petechiae  Musculoskeletal no deformity  Neuro grossly intact  Psych anxious    Lab CMP normal.  Troponin T less than 6    Assessment and plans    1.  Precordial pain  Tn is negative. EKG is somewhat concerning given his age and risk factor.  Would continue serial troponin.  If troponin is negative and no recurrent chest pain, could first proceed with pharmacological myocardial perfusion imaging and ECHO.  Certainly if troponin become elevated continue have chest pain will proceed with cardiac catheterization.  We will hold off on Xarelto.  Continue statin.  Start low-dose beta-blocker.    2.  Paroxysmal atrial fibrillation currently in sinus rhythm  Hold Xarelto as above    3.  Chronic anticoagulation for atrial fibrillation  As above    4.  Hyperlipidemia  Continue statin    5.  Palpitations prob PVCs  In-patient cardiac monitor  ECHO    Will follow the patient along with you.  Thank you consultation.    Please note that this dictation was created using voice recognition software. I have worked with consultants from the vendor as well as technical experts from [employer] to optimize the interface. I have made every reasonable attempt to correct obvious errors, but I expect that there are errors of grammar and " possibly content I did not discover before finalizing the note

## 2021-02-23 NOTE — CARE PLAN
Problem: Safety  Goal: Will remain free from injury  Outcome: PROGRESSING AS EXPECTED  Goal: Will remain free from falls  Outcome: PROGRESSING AS EXPECTED  Note: Pt educated on safety precautions, utilization of the call light, and bed alarm.  Pt verbalized understanding.      Problem: Pain Management  Goal: Pain level will decrease to patient's comfort goal  Outcome: PROGRESSING AS EXPECTED     Problem: Infection  Goal: Will remain free from infection  Outcome: PROGRESSING AS EXPECTED  Note: Implement standard precautions and perform handwashing before and after patient contact. RN will follow protocols and necessary steps to minimize the spread of infection.  RN educated pt and any visitors on proper hand hygiene.      Problem: Knowledge Deficit  Goal: Knowledge of disease process/condition, treatment plan, diagnostic tests, and medications will improve  Outcome: PROGRESSING AS EXPECTED  Goal: Knowledge of the prescribed therapeutic regimen will improve  Outcome: PROGRESSING AS EXPECTED

## 2021-02-23 NOTE — SENIOR ADMIT NOTE
Senior Admission note     Date of Service: 2/22/2021  Attending: RADHA Paniagua M.D.   Senior Resident: Dr. Perez  Intern: Dr. Moralez  Contact:  [phone]    Chief Complaint:     Chest pain    Chief Complaint   Patient presents with   • Chest Pain     BIB EMS of intermittent chest pain since last night. Described as pressure. Transferred from [facility] for further eval of ST Depression. Pt given 1 in of Nitro, reporting no chest pain currently.         Subjective:  is a 75-year-old male with past medical history of paroxysmal atrial fibrillation ( on Xarelto ), sleep apnea, hyperlipidemia who was transferred from [facility] to [facility] for intermittent  chest pain with concern of NSTEMI based on EKG at their facility.  Patient complaining of intermittent episodes of pressure type substernal chest pain since yesterday night.  Each episode lasting for 10 to 15 minutes and resolving by itself. Lying down increases the discomfort. patient also states that he has epigastric pressure type symptoms with some substernal discomfort in past which usually improves after belching/burping but never had multiple episodes like last night.  Pain not associated with exertion, does not radiate to jaw or arm , denies shortness of breath, diaphoresis, nausea, vomiting.  Denies smoking, alcohol or illicit drug use.  He is following up with cardiologist in [place] for atrial fibrillation.      Consultants/Specialty:  Cardiology    Physical Exam:   CVS : no JVD, no carotid bruits   Heart RRR, normal rate, no murmur, gallop or rub  RS : CTAB , no rales or wheezing    Labs:   Troponin x 2 negative     Assessment and plan:    # Precordial pain :  # Paroxysmal atrial fibrillation   # Hyperlipidemia    Differentials : Unstable angina , esophageal spasm, dyspepsia  ECG shows no ischemic changes on admission, though ST depression V3-V5 at [facility] concerning for  anterior ischemia.  Troponin negative so far.    Plan:  Place on telemetry   Continue serial troponin   Received  mg at other facility  Continue ASA 81 mg daily starting tomorrow  Continue statin therapy  Cardiology consulted , Dr Sheriff  Appreciate Cardiology rec's.  No Heparin drip , hold Xarelto ( for possible procedure)  start on metoprolol    If troponin remains negative and no chest pain over night, cardiology planning MPI and echo   If symptomatic or elevated Trop - [facility]   Concern of esophageal spasm / - will start simethicone

## 2021-02-24 ENCOUNTER — PATIENT OUTREACH (OUTPATIENT)
Dept: HEALTH INFORMATION MANAGEMENT | Facility: OTHER | Age: 76
End: 2021-02-24

## 2021-02-24 ENCOUNTER — ANESTHESIA (OUTPATIENT)
Dept: CARDIOLOGY | Facility: MEDICAL CENTER | Age: 76
End: 2021-02-24
Payer: COMMERCIAL

## 2021-02-24 ENCOUNTER — APPOINTMENT (OUTPATIENT)
Dept: CARDIOLOGY | Facility: MEDICAL CENTER | Age: 76
End: 2021-02-24
Attending: PHYSICIAN ASSISTANT
Payer: COMMERCIAL

## 2021-02-24 ENCOUNTER — ANESTHESIA EVENT (OUTPATIENT)
Dept: CARDIOLOGY | Facility: MEDICAL CENTER | Age: 76
End: 2021-02-24
Payer: COMMERCIAL

## 2021-02-24 VITALS
SYSTOLIC BLOOD PRESSURE: 134 MMHG | WEIGHT: 138.89 LBS | BODY MASS INDEX: 21.05 KG/M2 | HEART RATE: 59 BPM | DIASTOLIC BLOOD PRESSURE: 64 MMHG | TEMPERATURE: 97.4 F | HEIGHT: 68 IN | RESPIRATION RATE: 17 BRPM | OXYGEN SATURATION: 97 %

## 2021-02-24 LAB
D DIMER PPP IA.FEU-MCNC: 0.35 UG/ML (FEU) (ref 0–0.5)
LV EJECT FRACT  99904: 70

## 2021-02-24 PROCEDURE — 85379 FIBRIN DEGRADATION QUANT: CPT

## 2021-02-24 PROCEDURE — 93325 DOPPLER ECHO COLOR FLOW MAPG: CPT

## 2021-02-24 PROCEDURE — 93312 ECHO TRANSESOPHAGEAL: CPT | Mod: 26 | Performed by: INTERNAL MEDICINE

## 2021-02-24 PROCEDURE — 700102 HCHG RX REV CODE 250 W/ 637 OVERRIDE(OP): Performed by: GENERAL PRACTICE

## 2021-02-24 PROCEDURE — 160002 HCHG RECOVERY MINUTES (STAT)

## 2021-02-24 PROCEDURE — A9270 NON-COVERED ITEM OR SERVICE: HCPCS | Performed by: GENERAL PRACTICE

## 2021-02-24 PROCEDURE — 700105 HCHG RX REV CODE 258: Performed by: STUDENT IN AN ORGANIZED HEALTH CARE EDUCATION/TRAINING PROGRAM

## 2021-02-24 PROCEDURE — G0378 HOSPITAL OBSERVATION PER HR: HCPCS

## 2021-02-24 PROCEDURE — 700111 HCHG RX REV CODE 636 W/ 250 OVERRIDE (IP): Performed by: STUDENT IN AN ORGANIZED HEALTH CARE EDUCATION/TRAINING PROGRAM

## 2021-02-24 PROCEDURE — 99214 OFFICE O/P EST MOD 30 MIN: CPT | Performed by: INTERNAL MEDICINE

## 2021-02-24 PROCEDURE — 99217 PR OBSERVATION CARE DISCHARGE: CPT | Performed by: INTERNAL MEDICINE

## 2021-02-24 PROCEDURE — 700101 HCHG RX REV CODE 250: Performed by: STUDENT IN AN ORGANIZED HEALTH CARE EDUCATION/TRAINING PROGRAM

## 2021-02-24 RX ORDER — ONDANSETRON 2 MG/ML
4 INJECTION INTRAMUSCULAR; INTRAVENOUS
Status: DISCONTINUED | OUTPATIENT
Start: 2021-02-24 | End: 2021-02-24 | Stop reason: HOSPADM

## 2021-02-24 RX ORDER — HALOPERIDOL 5 MG/ML
1 INJECTION INTRAMUSCULAR
Status: DISCONTINUED | OUTPATIENT
Start: 2021-02-24 | End: 2021-02-24 | Stop reason: HOSPADM

## 2021-02-24 RX ORDER — SODIUM CHLORIDE, SODIUM LACTATE, POTASSIUM CHLORIDE, CALCIUM CHLORIDE 600; 310; 30; 20 MG/100ML; MG/100ML; MG/100ML; MG/100ML
INJECTION, SOLUTION INTRAVENOUS CONTINUOUS
Status: DISCONTINUED | OUTPATIENT
Start: 2021-02-24 | End: 2021-02-24 | Stop reason: HOSPADM

## 2021-02-24 RX ORDER — SODIUM CHLORIDE, SODIUM LACTATE, POTASSIUM CHLORIDE, CALCIUM CHLORIDE 600; 310; 30; 20 MG/100ML; MG/100ML; MG/100ML; MG/100ML
INJECTION, SOLUTION INTRAVENOUS
Status: DISCONTINUED | OUTPATIENT
Start: 2021-02-24 | End: 2021-02-24 | Stop reason: SURG

## 2021-02-24 RX ORDER — LIDOCAINE HYDROCHLORIDE 20 MG/ML
INJECTION, SOLUTION EPIDURAL; INFILTRATION; INTRACAUDAL; PERINEURAL PRN
Status: DISCONTINUED | OUTPATIENT
Start: 2021-02-24 | End: 2021-02-24 | Stop reason: SURG

## 2021-02-24 RX ADMIN — PROPOFOL 120 MG: 10 INJECTION, EMULSION INTRAVENOUS at 12:25

## 2021-02-24 RX ADMIN — LIDOCAINE HYDROCHLORIDE 60 ML: 20 INJECTION, SOLUTION EPIDURAL; INFILTRATION; INTRACAUDAL at 12:25

## 2021-02-24 RX ADMIN — ASPIRIN 81 MG: 81 TABLET, CHEWABLE ORAL at 05:30

## 2021-02-24 RX ADMIN — SODIUM CHLORIDE, POTASSIUM CHLORIDE, SODIUM LACTATE AND CALCIUM CHLORIDE: 600; 310; 30; 20 INJECTION, SOLUTION INTRAVENOUS at 12:19

## 2021-02-24 RX ADMIN — METOPROLOL TARTRATE 12.5 MG: 25 TABLET, FILM COATED ORAL at 05:31

## 2021-02-24 RX ADMIN — DILTIAZEM HYDROCHLORIDE 180 MG: 180 CAPSULE, COATED, EXTENDED RELEASE ORAL at 05:30

## 2021-02-24 ASSESSMENT — PAIN SCALES - WONG BAKER
WONGBAKER_NUMERICALRESPONSE: DOESN'T HURT AT ALL
WONGBAKER_NUMERICALRESPONSE: DOESN'T HURT AT ALL

## 2021-02-24 ASSESSMENT — PAIN DESCRIPTION - PAIN TYPE: TYPE: ACUTE PAIN

## 2021-02-24 NOTE — PROGRESS NOTES
"       [facility] Follow-up Note    Date of Service:    2/24/2021      Name:   Eugene Hay     YOB: 1945  Age:   75 y.o.  male   MRN:   [id number]      Chief Complaint: AFIB, CP, R atrial mass    HPI:  Mr Hay is a 74 y/o fellow with PMH including PAF on Xarelto who presented from [facility] with chest pain. Ruled out for ACS with negative trop x 3, however echo found a right atrial mass.    Review of Care Everywhere shows MURIEL from 2017 at [facility] in [place], [place] with R atrial abnormality.  Patient states he was aware of this, but they said it was \"fatty area\" nothing to worry about.  I reached out to [facility] 2/23/21 and they will try to email - or more likely - mail a disc copy of these images.     I can also see he had prior TTE in 11/2019 at outside facility.    Interim Events:  Patient doing well today   No new complaints  Awaiting MURIEL this afternoon.    ROS  Constitutional:  + fatigue. .  Respiratory:  Denies shortness of breath, no cough.  Cardiovascular:  + atypical chest pain.  No  lower extremity edema.  Denies orthopnea or PND.  : denies polyuria, no dysuria.  GI:  Denies nausea/vomiting.  No abdominal distention.  Neuro:  Denies dizziness, syncope.  Hem/lymph: Denies easy bleeding/bruising.      All other review of systems reviewed and negative.    Past medical, surgical, social, and family history reviewed and unchanged from admission except as noted in HPI.    Medications: Reviewed in MAR  Current Facility-Administered Medications   Medication Dose Frequency Provider Last Rate Last Admin   • atorvastatin (LIPITOR) tablet 40 mg  40 mg QHS Enio Moralez Jr., M.D.   40 mg at 02/23/21 2038   • aspirin (ASA) chewable tab 81 mg  81 mg DAILY Enio Moralez Jr., M.D.   81 mg at 02/24/21 0530   • nitroglycerin (NITROSTAT) tablet 0.4 mg  0.4 mg Q5 MIN PRN Enio Moralez Jr., M.D.       • simethicone (MYLICON) chewable tab 80 mg  80 mg Nightly Enio Moralez Jr., " "M.D.   80 mg at 02/23/21 2038   • DILTIAZem CD (CARDIZEM CD) capsule 180 mg  180 mg DAILY Enio Moralez Jr., M.D.   180 mg at 02/24/21 0530   • metoprolol tartrate (LOPRESSOR) tablet 12.5 mg  12.5 mg TWICE DAILY Enio Moralez Jr., M.D.   12.5 mg at 02/24/21 0531   Last reviewed on 2/22/2021 12:50 PM by Sally Fong T    No Known Allergies    Physical Exam  Body mass index is 21.12 kg/m². /56   Pulse 67   Temp 37.1 °C (98.7 °F) (Temporal)   Resp 18   Ht 1.727 m (5' 8\")   Wt 63 kg (138 lb 14.2 oz)   SpO2 96%    Vitals:    02/23/21 1956 02/24/21 0009 02/24/21 0437 02/24/21 0810   BP: 116/56 (!) 96/52 104/66 110/56   Pulse: 78 73 71 67   Resp: 18 16 16 18   Temp: 37.7 °C (99.8 °F) 36.5 °C (97.7 °F) 36.8 °C (98.3 °F) 37.1 °C (98.7 °F)   TempSrc: Temporal Temporal Temporal Temporal   SpO2: 98% 97% 96% 96%   Weight:       Height:        Oxygen Therapy:  Pulse Oximetry: 96 %, O2 (LPM): 0, O2 Delivery Device: None - Room Air    General: no apparent distress  Neck: no JVD   Lungs: normal effort,  without crackles, no wheezing or rhonchi  Heart: normal rate, regular rhythm, no murmur, no rub  EXT: no lower extremity edema  Abdomen: soft, non tender, non distended  Neurological: No focal deficits, no facial asymmetry.  Normal speech  Psychiatric: Appropriate affect, alert and oriented x 3  Skin: Warm extremities, no rash    Labs (personally reviewed):     Lab Results   Component Value Date/Time    SODIUM 140 02/23/2021 04:30 AM    POTASSIUM 3.8 02/23/2021 04:30 AM    CHLORIDE 106 02/23/2021 04:30 AM    CO2 23 02/23/2021 04:30 AM    GLUCOSE 85 02/23/2021 04:30 AM    BUN 18 02/23/2021 04:30 AM    CREATININE 0.81 02/23/2021 04:30 AM    CREATININE 1.0 04/10/2007 04:00 PM    BUNCREATRAT NOT APPLICABLE 09/11/2018 01:04 PM     Lab Results   Component Value Date/Time    ALKPHOSPHAT 78 02/23/2021 04:30 AM    ASTSGOT 11 (L) 02/23/2021 04:30 AM    ALTSGPT 8 02/23/2021 04:30 AM    TBILIRUBIN 0.9 02/23/2021 " 04:30 AM      Lab Results   Component Value Date/Time    CHOLSTRLTOT 101 2021 04:30 AM    LDL 48 2021 04:30 AM    HDL 41 2021 04:30 AM    TRIGLYCERIDE 62 2021 04:30 AM         Cardiac Imaging and Procedures Review:      Echo 21:  CONCLUSIONS  Normal chamber sizes. Normal RV and LV systolic function. LVEF 65%   visually. No significant valvular abnormalities. There is an echogenic   multilobular mass that appears to be attached to the free wall in the   RA, best seen in substernal and apical views. Consider CT or MRI to get   a better look at this mass. No prior study is available for comparison.    Echo 2019 ([facility])    INTERPRETATION SUMMARY  1.) The left ventricle is normal in size and function, ejection fraction is 70% calculated by biplane  2.) The right ventricle is normal in size and function  3.) No significant valvular abnormality seen  4.) Unable to assess for pulmonary hypertension    MURIEL 2017:    INTERPRETATION SUMMARY  1. Grossly normal LV size and systolic function.  2. Mild MR.  3. Trace AR, TR, and PI.  4. Prominent lopez terminalis is seen within the right atrium both on  surface echo images as well as MURIEL.  5. Lipomatous changes are seen associated with the tricuspid annulus giving a  prominent appearance. No pathological characteristics are observed. However,  if clinically indicated, recommend gated CT for further imaging.    Stress test 2019:  [place]  I cannot see results in care everywhere  No comments in follow up cardiology visit 19 (Dr. Minor)    Nuclear MPI 21:   NUCLEAR IMAGING INTERPRETATION   No evidence of significant jeopardized viable myocardium or prior myocardial    infarction.   Normal left ventricular size, ejection fraction, and wall motion.      Assessment and Medical Decision Makin   Chest pain, atypical   -    Ruled out for ACS with neg trop x 3  -    Nuclear stress test without ischemia 21    2    Paroxysmal atrial fibrillation  -   Continue Dilt 180, metoprolol 12.5 BID  -   Xarelto can resume this evening.    3   Hx of AVNRT    4   Right atrial mass  -    Plan for MURIEL this afternoon with Dr. Sheriff  -    MURIEL images from 1/2017 request from [facility] in [place], [place] - should be coming on disc.        Candi Wolfe PA-C  [facility]

## 2021-02-24 NOTE — ANESTHESIA PREPROCEDURE EVALUATION
74 yo with right atrial mass.  AFIB.    Relevant Problems   CARDIAC   (+) Paroxysmal atrial fibrillation (HCC)       Physical Exam    Airway   Mallampati: II  TM distance: >3 FB  Neck ROM: full       Cardiovascular - normal exam  Rhythm: regular  Rate: normal  (-) murmur     Dental - normal exam           Pulmonary - normal exam  Breath sounds clear to auscultation     Abdominal    Neurological - normal exam                 Anesthesia Plan    ASA 2       Plan - general       Airway plan will be natural airway          Induction: intravenous      Pertinent diagnostic labs and testing reviewed    Informed Consent:    Anesthetic plan and risks discussed with patient.    Use of blood products discussed with: patient whom consented to blood products.

## 2021-02-24 NOTE — ANESTHESIA TIME REPORT
Anesthesia Start and Stop Event Times     Date Time Event    2/24/2021 1219 Ready for Procedure     1219 Anesthesia Start     1244 Anesthesia Stop        Responsible Staff  02/24/21    Name Role Begin End    Dean Kruger M.D. Anesth 1219 1244        Preop Diagnosis (Free Text):  Pre-op Diagnosis             Preop Diagnosis (Codes):    Post op Diagnosis  Right atrial mass      Premium Reason  Non-Premium    Comments:

## 2021-02-24 NOTE — DISCHARGE INSTRUCTIONS
Discharge Instructions    Discharged to home by car with relative. Discharged via wheelchair, hospital escort: Yes.  Special equipment needed: Not Applicable    Be sure to schedule a follow-up appointment with your primary care doctor or any specialists as instructed.     Discharge Plan:   Diet Plan: Discussed  Activity Level: Discussed  Confirmed Follow up Appointment: Appointment Scheduled  Confirmed Symptoms Management: Discussed  Medication Reconciliation Updated: Yes  Influenza Vaccine Indication: Patient Refuses    I understand that a diet low in cholesterol, fat, and sodium is recommended for good health. Unless I have been given specific instructions below for another diet, I accept this instruction as my diet prescription.   Other diet: Heart Healthy    Special Instructions: None    · Is patient discharged on Warfarin / Coumadin?   No     Depression / Suicide Risk    As you are discharged from this [facility] facility, it is important to learn how to keep safe from harming yourself.    Recognize the warning signs:  · Abrupt changes in personality, positive or negative- including increase in energy   · Giving away possessions  · Change in eating patterns- significant weight changes-  positive or negative  · Change in sleeping patterns- unable to sleep or sleeping all the time   · Unwillingness or inability to communicate  · Depression  · Unusual sadness, discouragement and loneliness  · Talk of wanting to die  · Neglect of personal appearance   · Rebelliousness- reckless behavior  · Withdrawal from people/activities they love  · Confusion- inability to concentrate     If you or a loved one observes any of these behaviors or has concerns about self-harm, here's what you can do:  · Talk about it- your feelings and reasons for harming yourself  · Remove any means that you might use to hurt yourself (examples: pills, rope, extension cords, firearm)  · Get professional help from the community (Mental Health,  Substance Abuse, psychological counseling)  · Do not be alone:Call your Safe Contact- someone whom you trust who will be there for you.  · Call your local CRISIS HOTLINE 853-0191 or 867-814-1265  · Call your local Children's Mobile Crisis Response Team [place] (140) 384-2405 or www.Toptal  · Call the toll free National Suicide Prevention Hotlines   · National Suicide Prevention Lifeline 402-753-QQDU (4290)  · National Hope Line Network 800-SUICIDE (758-5728)

## 2021-02-24 NOTE — PROGRESS NOTES
Scheduling office called to set up follow up with cardiologist and PCP. Unable to make appointment. Patient will be given resources to set up after discharge

## 2021-02-24 NOTE — PROGRESS NOTES
Brief Cardiology Update:    Nuclear stress test 2/23/21 without ischemia  Suspect non cardiac etiology for atypical chest pain    Will proceed with MURIEL tomorrow ( Wed 2/24)  I have also requested disc copy of MURIEL from 1/6/2017 from [facility] for comparison.     Patient agreeable  NPO at midnight, time of MURIEL pending.    Updated RN and Dr. Carter to plan.    Candi Wolfe PA-C  2/23/2021  [id number]

## 2021-02-24 NOTE — DISCHARGE SUMMARY
Discharge Summary    CHIEF COMPLAINT ON ADMISSION  Chief Complaint   Patient presents with   • Chest Pain     BIB EMS of intermittent chest pain since last night. Described as pressure. Transferred from [facility] for further eval of ST Depression. Pt given 1 in of Nitro, reporting no chest pain currently.       Reason for Admission  EMS     Admission Date  2/22/2021    CODE STATUS  Full Code    HPI & HOSPITAL COURSE  This is a 75 y.o. male with atrial fibrillation on Xarelto, history of mitral valve prolapse, history of prostate cancer, hyperlipidemia, admitted 2/22/2021 with atypical chest pain.  Troponin was negative.  EKG showed nonspecific 1 mm ST depressions in anterior leads.  Cardiology was consulted, who recommended stress testing. His troponins remained negative x3.  Echocardiogram showed EF of 65%, with no significant valvular abnormalities, with echogenic multilobular mass that appears to be attached to the free wall in the right atrium best seen in the substernal and apical views.  Cardiology requested for previous echo and MURIEL records from [place], but in the interim decided to pursue another  MURIEL with cardiology giving opinion that the atrial mass is likely fatty tissue.  He had a nuclear cardiac stress test which showed no evidence of significant jeopardized viable myocardium or prior MI, and no electrocardiographic signs of ischemia.      Otherwise he remained hemodynamically stable and afebrile, and his chest pain has resolved and has not recurred. I have personally seen and examined the patient on the day of discharge. With his clinical improvement and negative cardiac work-up, he was deemed ready to discharge from the hospital as he did not have any further hospitalization needs. Patient felt comfortable going home. The discharge plan was discussed with the patient, with which he was agreeable to.     Therefore, he is discharged in good and stable condition to home with close  outpatient follow-up.        Discharge Date  2/24/2021      FOLLOW UP ITEMS POST DISCHARGE  -He will follow up with cardiology in the office.  - counseled to seek immediate medical attention, or return to the ED for recurrent or worsening symptoms.      DISCHARGE DIAGNOSES  Principal Problem:    Precordial pain POA: Yes  Active Problems:    Right atrial mass POA: Yes    Paroxysmal atrial fibrillation (HCC) POA: Yes    Hyperlipidemia POA: Yes    Chronic anticoagulation POA: Yes  Resolved Problems:    * No resolved hospital problems. *      FOLLOW UP  No future appointments.  No follow-up provider specified.    MEDICATIONS ON DISCHARGE     Medication List      CONTINUE taking these medications      Instructions   diltiazem 180 MG SR capsule  Commonly known as: TIAZAC   Take 180 mg by mouth every day.  Dose: 180 mg     Lipitor 40 MG Tabs  Generic drug: atorvastatin   Take 40 mg by mouth every bedtime.  Dose: 40 mg     VITAMIN E PO   Take 1 capsule by mouth every day.  Dose: 1 capsule     Xarelto 20 MG Tabs tablet  Generic drug: rivaroxaban   Take 20 mg by mouth every morning.  Dose: 20 mg     ZINC PO   Take 1 capsule by mouth every day.  Dose: 1 capsule            Allergies  No Known Allergies    DIET  Orders Placed This Encounter   Procedures   • Diet Order Diet: Cardiac     Standing Status:   Standing     Number of Occurrences:   1     Order Specific Question:   Diet:     Answer:   Cardiac [6]       ACTIVITY  As tolerated.  Weight bearing as tolerated    CONSULTATIONS  Cardiology    PROCEDURES  As above    LABORATORY  Lab Results   Component Value Date    SODIUM 140 02/23/2021    POTASSIUM 3.8 02/23/2021    CHLORIDE 106 02/23/2021    CO2 23 02/23/2021    GLUCOSE 85 02/23/2021    BUN 18 02/23/2021    CREATININE 0.81 02/23/2021    CREATININE 1.0 04/10/2007        Lab Results   Component Value Date    WBC 4.8 02/23/2021    HEMOGLOBIN 13.2 (L) 02/23/2021    HEMATOCRIT 40.4 (L) 02/23/2021    PLATELETCT 112 (L) 02/23/2021         Total time of the discharge process = 31 minutes.

## 2021-02-25 NOTE — PROGRESS NOTES
Patient discharged home. Discharge packet with patient. Patient arranged uber for ride home. IV removed

## 2021-02-28 ENCOUNTER — PATIENT MESSAGE (OUTPATIENT)
Dept: CARDIOLOGY | Facility: MEDICAL CENTER | Age: 76
End: 2021-02-28